# Patient Record
Sex: MALE | Race: WHITE | Employment: OTHER
[De-identification: names, ages, dates, MRNs, and addresses within clinical notes are randomized per-mention and may not be internally consistent; named-entity substitution may affect disease eponyms.]

---

## 2023-05-12 ENCOUNTER — APPOINTMENT (OUTPATIENT)
Facility: HOSPITAL | Age: 73
DRG: 194 | End: 2023-05-12
Payer: MEDICARE

## 2023-05-12 ENCOUNTER — HOSPITAL ENCOUNTER (EMERGENCY)
Facility: HOSPITAL | Age: 73
Discharge: HOME | DRG: 194 | End: 2023-05-12
Attending: EMERGENCY MEDICINE
Payer: MEDICARE

## 2023-05-12 VITALS
BODY MASS INDEX: 27.15 KG/M2 | RESPIRATION RATE: 19 BRPM | DIASTOLIC BLOOD PRESSURE: 50 MMHG | HEART RATE: 85 BPM | OXYGEN SATURATION: 96 % | TEMPERATURE: 97.9 F | WEIGHT: 173 LBS | SYSTOLIC BLOOD PRESSURE: 129 MMHG | HEIGHT: 67 IN

## 2023-05-12 DIAGNOSIS — E87.1 HYPONATREMIA: ICD-10-CM

## 2023-05-12 DIAGNOSIS — R53.83 OTHER FATIGUE: Primary | ICD-10-CM

## 2023-05-12 LAB
ALBUMIN SERPL-MCNC: 2.8 G/DL (ref 3.4–5)
ALBUMIN/GLOB SERPL: 0.7 (ref 0.8–1.7)
ALP SERPL-CCNC: 75 U/L (ref 45–117)
ALT SERPL-CCNC: 32 U/L (ref 16–61)
ANION GAP SERPL CALC-SCNC: 6 MMOL/L (ref 3–18)
AST SERPL-CCNC: 38 U/L (ref 10–38)
BASOPHILS # BLD: 0 K/UL (ref 0–0.1)
BASOPHILS NFR BLD: 0 % (ref 0–2)
BILIRUB SERPL-MCNC: 1.2 MG/DL (ref 0.2–1)
BUN SERPL-MCNC: 18 MG/DL (ref 7–18)
BUN/CREAT SERPL: 16 (ref 12–20)
CALCIUM SERPL-MCNC: 8.1 MG/DL (ref 8.5–10.1)
CHLORIDE SERPL-SCNC: 99 MMOL/L (ref 100–111)
CO2 SERPL-SCNC: 25 MMOL/L (ref 21–32)
CREAT SERPL-MCNC: 1.15 MG/DL (ref 0.6–1.3)
DIFFERENTIAL METHOD BLD: ABNORMAL
EKG ATRIAL RATE: 90 BPM
EKG DIAGNOSIS: NORMAL
EKG P AXIS: 35 DEGREES
EKG P-R INTERVAL: 152 MS
EKG Q-T INTERVAL: 360 MS
EKG QRS DURATION: 88 MS
EKG QTC CALCULATION (BAZETT): 440 MS
EKG R AXIS: 26 DEGREES
EKG T AXIS: 36 DEGREES
EKG VENTRICULAR RATE: 90 BPM
EOSINOPHIL # BLD: 0 K/UL (ref 0–0.4)
EOSINOPHIL NFR BLD: 0 % (ref 0–5)
ERYTHROCYTE [DISTWIDTH] IN BLOOD BY AUTOMATED COUNT: 13.1 % (ref 11.6–14.5)
GLOBULIN SER CALC-MCNC: 4.1 G/DL (ref 2–4)
GLUCOSE SERPL-MCNC: 99 MG/DL (ref 74–99)
HCT VFR BLD AUTO: 33 % (ref 36–48)
HGB BLD-MCNC: 11.5 G/DL (ref 13–16)
IMM GRANULOCYTES # BLD AUTO: 0.1 K/UL (ref 0–0.04)
IMM GRANULOCYTES NFR BLD AUTO: 0 % (ref 0–0.5)
LACTATE SERPL-SCNC: 0.6 MMOL/L (ref 0.4–2)
LYMPHOCYTES # BLD: 1 K/UL (ref 0.9–3.6)
LYMPHOCYTES NFR BLD: 8 % (ref 21–52)
MCH RBC QN AUTO: 30.9 PG (ref 24–34)
MCHC RBC AUTO-ENTMCNC: 34.8 G/DL (ref 31–37)
MCV RBC AUTO: 88.7 FL (ref 78–100)
MONOCYTES # BLD: 0.8 K/UL (ref 0.05–1.2)
MONOCYTES NFR BLD: 6 % (ref 3–10)
NEUTS SEG # BLD: 10.5 K/UL (ref 1.8–8)
NEUTS SEG NFR BLD: 85 % (ref 40–73)
NRBC # BLD: 0 K/UL (ref 0–0.01)
NRBC BLD-RTO: 0 PER 100 WBC
PLATELET # BLD AUTO: 190 K/UL (ref 135–420)
PMV BLD AUTO: 9.8 FL (ref 9.2–11.8)
POTASSIUM SERPL-SCNC: 3.7 MMOL/L (ref 3.5–5.5)
PROT SERPL-MCNC: 6.9 G/DL (ref 6.4–8.2)
RBC # BLD AUTO: 3.72 M/UL (ref 4.35–5.65)
SODIUM SERPL-SCNC: 130 MMOL/L (ref 136–145)
WBC # BLD AUTO: 12.4 K/UL (ref 4.6–13.2)

## 2023-05-12 PROCEDURE — 71045 X-RAY EXAM CHEST 1 VIEW: CPT

## 2023-05-12 PROCEDURE — 70498 CT ANGIOGRAPHY NECK: CPT

## 2023-05-12 PROCEDURE — 4A03X5D MEASUREMENT OF ARTERIAL FLOW, INTRACRANIAL, EXTERNAL APPROACH: ICD-10-PCS | Performed by: EMERGENCY MEDICINE

## 2023-05-12 PROCEDURE — 83605 ASSAY OF LACTIC ACID: CPT

## 2023-05-12 PROCEDURE — 87040 BLOOD CULTURE FOR BACTERIA: CPT

## 2023-05-12 PROCEDURE — 80053 COMPREHEN METABOLIC PANEL: CPT

## 2023-05-12 PROCEDURE — 2580000003 HC RX 258: Performed by: EMERGENCY MEDICINE

## 2023-05-12 PROCEDURE — 6360000004 HC RX CONTRAST MEDICATION: Performed by: EMERGENCY MEDICINE

## 2023-05-12 PROCEDURE — 70496 CT ANGIOGRAPHY HEAD: CPT

## 2023-05-12 PROCEDURE — 93005 ELECTROCARDIOGRAM TRACING: CPT | Performed by: EMERGENCY MEDICINE

## 2023-05-12 PROCEDURE — 85025 COMPLETE CBC W/AUTO DIFF WBC: CPT

## 2023-05-12 RX ORDER — 0.9 % SODIUM CHLORIDE 0.9 %
1000 INTRAVENOUS SOLUTION INTRAVENOUS ONCE
Status: COMPLETED | OUTPATIENT
Start: 2023-05-12 | End: 2023-05-12

## 2023-05-12 RX ADMIN — SODIUM CHLORIDE 1000 ML: 9 INJECTION, SOLUTION INTRAVENOUS at 22:43

## 2023-05-12 RX ADMIN — IOPAMIDOL 100 ML: 755 INJECTION, SOLUTION INTRAVENOUS at 20:00

## 2023-05-12 ASSESSMENT — PAIN - FUNCTIONAL ASSESSMENT: PAIN_FUNCTIONAL_ASSESSMENT: NONE - DENIES PAIN

## 2023-05-12 NOTE — ED TRIAGE NOTES
Pt arrived with c/o increased falls and instability x3 days. Pt describes increased dizziness and fatigued. Pt has weakness on his left arm r/t rotator cuff injury. Pt evaluated by PA in triage and no stroke work up is needed att.

## 2023-05-13 NOTE — ED PROVIDER NOTES
[facility] EMERGENCY DEPT  EMERGENCY DEPARTMENT ENCOUNTER    Patient Name: Halima Tobar  MRN: [id number]  YOB: 1950  Provider: Gissell Cook MD  PCP: No primary care provider on file. Time/Date of evaluation: 9:39 PM EDT on 5/12/23    History of Presenting Illness     Chief Complaint   Patient presents with    Fatigue    Fall       History Provided By: Patient and EMS     History [facility]):   Halima Tobar is a 68 y.o. male who presents to the emergency department 24-year-old male presents to the emergency department with dizziness and collapse at home. Patient complains about increased falls and instability for the last 3 days. Describes it as dizziness and fatigue. He has had some weakness of his left arm due to a rotator cuff injury and there was initially potential concern for stroke on presentation and when he came into the waiting room. PA evaluation did not reveal concern for acute stroke at that time. Patient relates that he was turning the faucet on for the outdoors pick it when he fell over in the bushes and required neighbors assistance to get up and move. Nursing Notes were all reviewed and agreed with or any disagreements were addressed in the HPI. Past History     Past Medical History:  No past medical history on file. Past Surgical History:  No past surgical history on file. Family History:  No family history on file. Social History:       Medications:  Current Facility-Administered Medications   Medication Dose Route Frequency Provider Last Rate Last Admin    0.9 % sodium chloride bolus  1,000 mL IntraVENous Once Gissell Cook .9 mL/hr at 05/12/23 2243 1,000 mL at 05/12/23 2243     No current outpatient medications on file.        Allergies:  No Known Allergies    Social Determinants of Health:  Social Determinants of Health     Tobacco Use: Not on file   Alcohol Use: Not on file   Financial Resource Strain: Not on file   Food Insecurity: Not on file

## 2023-05-14 ENCOUNTER — HOSPITAL ENCOUNTER (INPATIENT)
Facility: HOSPITAL | Age: 73
LOS: 3 days | Discharge: HOME | DRG: 194 | End: 2023-05-17
Attending: EMERGENCY MEDICINE | Admitting: FAMILY MEDICINE
Payer: MEDICARE

## 2023-05-14 ENCOUNTER — APPOINTMENT (OUTPATIENT)
Facility: HOSPITAL | Age: 73
DRG: 194 | End: 2023-05-14
Payer: MEDICARE

## 2023-05-14 DIAGNOSIS — J18.9 PNEUMONIA OF RIGHT UPPER LOBE DUE TO INFECTIOUS ORGANISM: Primary | ICD-10-CM

## 2023-05-14 DIAGNOSIS — J18.9 PNEUMONIA OF RIGHT LOWER LOBE DUE TO INFECTIOUS ORGANISM: ICD-10-CM

## 2023-05-14 DIAGNOSIS — J18.9 PNEUMONIA OF RIGHT MIDDLE LOBE DUE TO INFECTIOUS ORGANISM: ICD-10-CM

## 2023-05-14 DIAGNOSIS — R53.1 GENERAL WEAKNESS: ICD-10-CM

## 2023-05-14 LAB
ALBUMIN SERPL-MCNC: 2.3 G/DL (ref 3.4–5)
ALBUMIN/GLOB SERPL: 0.5 (ref 0.8–1.7)
ALP SERPL-CCNC: 85 U/L (ref 45–117)
ALT SERPL-CCNC: 38 U/L (ref 16–61)
ANION GAP SERPL CALC-SCNC: 7 MMOL/L (ref 3–18)
ANION GAP SERPL CALC-SCNC: 8 MMOL/L (ref 3–18)
AST SERPL-CCNC: 47 U/L (ref 10–38)
BACTERIA SPEC CULT: NORMAL
BACTERIA SPEC CULT: NORMAL
BASOPHILS # BLD: 0 K/UL (ref 0–0.1)
BASOPHILS NFR BLD: 0 % (ref 0–2)
BILIRUB SERPL-MCNC: 1.6 MG/DL (ref 0.2–1)
BUN SERPL-MCNC: 17 MG/DL (ref 7–18)
BUN SERPL-MCNC: 18 MG/DL (ref 7–18)
BUN/CREAT SERPL: 19 (ref 12–20)
BUN/CREAT SERPL: 20 (ref 12–20)
CALCIUM SERPL-MCNC: 8.4 MG/DL (ref 8.5–10.1)
CALCIUM SERPL-MCNC: 8.6 MG/DL (ref 8.5–10.1)
CHLORIDE SERPL-SCNC: 102 MMOL/L (ref 100–111)
CHLORIDE SERPL-SCNC: 99 MMOL/L (ref 100–111)
CO2 SERPL-SCNC: 23 MMOL/L (ref 21–32)
CO2 SERPL-SCNC: 26 MMOL/L (ref 21–32)
CREAT SERPL-MCNC: 0.86 MG/DL (ref 0.6–1.3)
CREAT SERPL-MCNC: 0.95 MG/DL (ref 0.6–1.3)
DIFFERENTIAL METHOD BLD: ABNORMAL
EOSINOPHIL # BLD: 0 K/UL (ref 0–0.4)
EOSINOPHIL NFR BLD: 0 % (ref 0–5)
ERYTHROCYTE [DISTWIDTH] IN BLOOD BY AUTOMATED COUNT: 13.2 % (ref 11.6–14.5)
FLUAV RNA SPEC QL NAA+PROBE: NOT DETECTED
FLUBV RNA SPEC QL NAA+PROBE: NOT DETECTED
GLOBULIN SER CALC-MCNC: 4.2 G/DL (ref 2–4)
GLUCOSE SERPL-MCNC: 91 MG/DL (ref 74–99)
GLUCOSE SERPL-MCNC: 94 MG/DL (ref 74–99)
HCT VFR BLD AUTO: 32.5 % (ref 36–48)
HGB BLD-MCNC: 11.2 G/DL (ref 13–16)
IMM GRANULOCYTES # BLD AUTO: 1.1 K/UL (ref 0–0.04)
IMM GRANULOCYTES NFR BLD AUTO: 8 % (ref 0–0.5)
LACTATE BLD-SCNC: 0.92 MMOL/L (ref 0.4–2)
LYMPHOCYTES # BLD: 0.7 K/UL (ref 0.9–3.6)
LYMPHOCYTES NFR BLD: 5 % (ref 21–52)
MAGNESIUM SERPL-MCNC: 2.1 MG/DL (ref 1.6–2.6)
MCH RBC QN AUTO: 31.3 PG (ref 24–34)
MCHC RBC AUTO-ENTMCNC: 34.5 G/DL (ref 31–37)
MCV RBC AUTO: 90.8 FL (ref 78–100)
MONOCYTES # BLD: 0.8 K/UL (ref 0.05–1.2)
MONOCYTES NFR BLD: 6 % (ref 3–10)
NEUTS SEG # BLD: 10.5 K/UL (ref 1.8–8)
NEUTS SEG NFR BLD: 80 % (ref 40–73)
NRBC # BLD: 0 K/UL (ref 0–0.01)
NRBC BLD-RTO: 0 PER 100 WBC
PLATELET # BLD AUTO: 215 K/UL (ref 135–420)
PMV BLD AUTO: 10.3 FL (ref 9.2–11.8)
POTASSIUM SERPL-SCNC: 3.3 MMOL/L (ref 3.5–5.5)
POTASSIUM SERPL-SCNC: 4.5 MMOL/L (ref 3.5–5.5)
PROT SERPL-MCNC: 6.5 G/DL (ref 6.4–8.2)
RBC # BLD AUTO: 3.58 M/UL (ref 4.35–5.65)
SARS-COV-2 RNA RESP QL NAA+PROBE: NOT DETECTED
SERVICE CMNT-IMP: NORMAL
SERVICE CMNT-IMP: NORMAL
SODIUM SERPL-SCNC: 132 MMOL/L (ref 136–145)
SODIUM SERPL-SCNC: 133 MMOL/L (ref 136–145)
TROPONIN I SERPL HS-MCNC: 12 NG/L (ref 0–78)
TROPONIN I SERPL HS-MCNC: 21 NG/L (ref 0–78)
WBC # BLD AUTO: 13.1 K/UL (ref 4.6–13.2)

## 2023-05-14 PROCEDURE — 99285 EMERGENCY DEPT VISIT HI MDM: CPT

## 2023-05-14 PROCEDURE — 71045 X-RAY EXAM CHEST 1 VIEW: CPT

## 2023-05-14 PROCEDURE — 2580000003 HC RX 258: Performed by: EMERGENCY MEDICINE

## 2023-05-14 PROCEDURE — 1100000000 HC RM PRIVATE

## 2023-05-14 PROCEDURE — 87040 BLOOD CULTURE FOR BACTERIA: CPT

## 2023-05-14 PROCEDURE — 85025 COMPLETE CBC W/AUTO DIFF WBC: CPT

## 2023-05-14 PROCEDURE — 93005 ELECTROCARDIOGRAM TRACING: CPT | Performed by: EMERGENCY MEDICINE

## 2023-05-14 PROCEDURE — 6360000002 HC RX W HCPCS: Performed by: EMERGENCY MEDICINE

## 2023-05-14 PROCEDURE — 96361 HYDRATE IV INFUSION ADD-ON: CPT

## 2023-05-14 PROCEDURE — 87636 SARSCOV2 & INF A&B AMP PRB: CPT

## 2023-05-14 PROCEDURE — 71260 CT THORAX DX C+: CPT

## 2023-05-14 PROCEDURE — 2580000003 HC RX 258: Performed by: FAMILY MEDICINE

## 2023-05-14 PROCEDURE — 84484 ASSAY OF TROPONIN QUANT: CPT

## 2023-05-14 PROCEDURE — 83605 ASSAY OF LACTIC ACID: CPT

## 2023-05-14 PROCEDURE — 6360000004 HC RX CONTRAST MEDICATION: Performed by: EMERGENCY MEDICINE

## 2023-05-14 PROCEDURE — 96375 TX/PRO/DX INJ NEW DRUG ADDON: CPT

## 2023-05-14 PROCEDURE — 80053 COMPREHEN METABOLIC PANEL: CPT

## 2023-05-14 PROCEDURE — 96365 THER/PROPH/DIAG IV INF INIT: CPT

## 2023-05-14 PROCEDURE — 83735 ASSAY OF MAGNESIUM: CPT

## 2023-05-14 RX ORDER — SODIUM CHLORIDE 0.9 % (FLUSH) 0.9 %
5-40 SYRINGE (ML) INJECTION EVERY 12 HOURS SCHEDULED
Status: DISCONTINUED | OUTPATIENT
Start: 2023-05-14 | End: 2023-05-17 | Stop reason: HOSPADM

## 2023-05-14 RX ORDER — SODIUM CHLORIDE 0.9 % (FLUSH) 0.9 %
5-40 SYRINGE (ML) INJECTION PRN
Status: DISCONTINUED | OUTPATIENT
Start: 2023-05-14 | End: 2023-05-17 | Stop reason: HOSPADM

## 2023-05-14 RX ORDER — BUDESONIDE 0.5 MG/2ML
0.5 INHALANT ORAL 2 TIMES DAILY
Status: DISCONTINUED | OUTPATIENT
Start: 2023-05-14 | End: 2023-05-17 | Stop reason: HOSPADM

## 2023-05-14 RX ORDER — ACETAMINOPHEN 650 MG/1
650 SUPPOSITORY RECTAL EVERY 6 HOURS PRN
Status: DISCONTINUED | OUTPATIENT
Start: 2023-05-14 | End: 2023-05-17 | Stop reason: HOSPADM

## 2023-05-14 RX ORDER — ONDANSETRON 2 MG/ML
4 INJECTION INTRAMUSCULAR; INTRAVENOUS EVERY 6 HOURS PRN
Status: DISCONTINUED | OUTPATIENT
Start: 2023-05-14 | End: 2023-05-17 | Stop reason: HOSPADM

## 2023-05-14 RX ORDER — ENOXAPARIN SODIUM 100 MG/ML
40 INJECTION SUBCUTANEOUS DAILY
Status: DISCONTINUED | OUTPATIENT
Start: 2023-05-15 | End: 2023-05-17 | Stop reason: HOSPADM

## 2023-05-14 RX ORDER — GUAIFENESIN/DEXTROMETHORPHAN 100-10MG/5
5 SYRUP ORAL EVERY 4 HOURS PRN
Status: DISCONTINUED | OUTPATIENT
Start: 2023-05-14 | End: 2023-05-17 | Stop reason: HOSPADM

## 2023-05-14 RX ORDER — ALPRAZOLAM 1 MG/1
1 TABLET ORAL 2 TIMES DAILY
Status: ON HOLD | COMMUNITY
Start: 2023-04-05 | End: 2023-05-15

## 2023-05-14 RX ORDER — IPRATROPIUM BROMIDE AND ALBUTEROL SULFATE 2.5; .5 MG/3ML; MG/3ML
1 SOLUTION RESPIRATORY (INHALATION) EVERY 4 HOURS PRN
Status: DISCONTINUED | OUTPATIENT
Start: 2023-05-14 | End: 2023-05-17 | Stop reason: HOSPADM

## 2023-05-14 RX ORDER — QUETIAPINE FUMARATE 50 MG/1
50 TABLET, FILM COATED ORAL DAILY
Status: ON HOLD | COMMUNITY
Start: 2021-01-27 | End: 2023-05-15

## 2023-05-14 RX ORDER — 0.9 % SODIUM CHLORIDE 0.9 %
1000 INTRAVENOUS SOLUTION INTRAVENOUS ONCE
Status: COMPLETED | OUTPATIENT
Start: 2023-05-14 | End: 2023-05-14

## 2023-05-14 RX ORDER — ROSUVASTATIN CALCIUM 20 MG/1
20 TABLET, COATED ORAL DAILY
COMMUNITY
Start: 2023-01-04

## 2023-05-14 RX ORDER — POLYETHYLENE GLYCOL 3350 17 G/17G
17 POWDER, FOR SOLUTION ORAL DAILY PRN
Status: DISCONTINUED | OUTPATIENT
Start: 2023-05-14 | End: 2023-05-17 | Stop reason: HOSPADM

## 2023-05-14 RX ORDER — SODIUM CHLORIDE 9 MG/ML
INJECTION, SOLUTION INTRAVENOUS PRN
Status: DISCONTINUED | OUTPATIENT
Start: 2023-05-14 | End: 2023-05-17 | Stop reason: HOSPADM

## 2023-05-14 RX ORDER — ONDANSETRON 4 MG/1
4 TABLET, ORALLY DISINTEGRATING ORAL EVERY 8 HOURS PRN
Status: DISCONTINUED | OUTPATIENT
Start: 2023-05-14 | End: 2023-05-17 | Stop reason: HOSPADM

## 2023-05-14 RX ORDER — ACETAMINOPHEN 325 MG/1
650 TABLET ORAL EVERY 6 HOURS PRN
Status: DISCONTINUED | OUTPATIENT
Start: 2023-05-14 | End: 2023-05-17 | Stop reason: HOSPADM

## 2023-05-14 RX ORDER — ALPRAZOLAM 1 MG/1
1 TABLET ORAL AS NEEDED
COMMUNITY
Start: 2023-05-09

## 2023-05-14 RX ORDER — FAMOTIDINE 20 MG/1
20 TABLET, FILM COATED ORAL 2 TIMES DAILY
Status: DISCONTINUED | OUTPATIENT
Start: 2023-05-14 | End: 2023-05-17 | Stop reason: HOSPADM

## 2023-05-14 RX ORDER — MAGNESIUM HYDROXIDE/ALUMINUM HYDROXICE/SIMETHICONE 120; 1200; 1200 MG/30ML; MG/30ML; MG/30ML
30 SUSPENSION ORAL EVERY 6 HOURS PRN
Status: DISCONTINUED | OUTPATIENT
Start: 2023-05-14 | End: 2023-05-17 | Stop reason: HOSPADM

## 2023-05-14 RX ORDER — AZITHROMYCIN 250 MG/1
500 TABLET, FILM COATED ORAL EVERY 24 HOURS
Status: DISCONTINUED | OUTPATIENT
Start: 2023-05-15 | End: 2023-05-15

## 2023-05-14 RX ORDER — SERTRALINE HYDROCHLORIDE 100 MG/1
100 TABLET, FILM COATED ORAL DAILY
COMMUNITY
Start: 2017-02-06

## 2023-05-14 RX ADMIN — AZITHROMYCIN MONOHYDRATE 500 MG: 500 INJECTION, POWDER, LYOPHILIZED, FOR SOLUTION INTRAVENOUS at 19:19

## 2023-05-14 RX ADMIN — CEFTRIAXONE 1000 MG: 1 INJECTION, POWDER, FOR SOLUTION INTRAMUSCULAR; INTRAVENOUS at 18:40

## 2023-05-14 RX ADMIN — SODIUM CHLORIDE 1000 ML: 900 INJECTION, SOLUTION INTRAVENOUS at 14:00

## 2023-05-14 RX ADMIN — SODIUM CHLORIDE, PRESERVATIVE FREE 10 ML: 5 INJECTION INTRAVENOUS at 22:36

## 2023-05-14 RX ADMIN — IOPAMIDOL 100 ML: 612 INJECTION, SOLUTION INTRAVENOUS at 19:11

## 2023-05-14 ASSESSMENT — PAIN DESCRIPTION - LOCATION: LOCATION: ABDOMEN

## 2023-05-14 ASSESSMENT — PAIN - FUNCTIONAL ASSESSMENT: PAIN_FUNCTIONAL_ASSESSMENT: 0-10

## 2023-05-14 ASSESSMENT — PAIN SCALES - GENERAL
PAINLEVEL_OUTOF10: 3
PAINLEVEL_OUTOF10: 0

## 2023-05-15 PROBLEM — R19.7 DIARRHEA: Status: ACTIVE | Noted: 2023-05-15

## 2023-05-15 PROBLEM — F17.290: Status: ACTIVE | Noted: 2023-05-15

## 2023-05-15 PROBLEM — J44.9 CHRONIC BRONCHITIS WITH COPD (CHRONIC OBSTRUCTIVE PULMONARY DISEASE) (HCC): Status: ACTIVE | Noted: 2023-05-15

## 2023-05-15 PROBLEM — E78.5 HLD (HYPERLIPIDEMIA): Status: ACTIVE | Noted: 2023-05-15

## 2023-05-15 PROBLEM — F41.9 ANXIETY: Status: ACTIVE | Noted: 2023-05-15

## 2023-05-15 PROBLEM — R53.1 GENERALIZED WEAKNESS: Status: ACTIVE | Noted: 2023-05-15

## 2023-05-15 LAB
ANION GAP SERPL CALC-SCNC: 8 MMOL/L (ref 3–18)
APPEARANCE UR: CLEAR
BACTERIA URNS QL MICRO: ABNORMAL /HPF
BASOPHILS # BLD: 0.1 K/UL (ref 0–0.1)
BASOPHILS NFR BLD: 0 % (ref 0–2)
BILIRUB UR QL: NEGATIVE
BUN SERPL-MCNC: 19 MG/DL (ref 7–18)
BUN/CREAT SERPL: 26 (ref 12–20)
CALCIUM SERPL-MCNC: 8.1 MG/DL (ref 8.5–10.1)
CHLORIDE SERPL-SCNC: 104 MMOL/L (ref 100–111)
CO2 SERPL-SCNC: 24 MMOL/L (ref 21–32)
COLOR UR: YELLOW
CREAT SERPL-MCNC: 0.74 MG/DL (ref 0.6–1.3)
DIFFERENTIAL METHOD BLD: ABNORMAL
EKG ATRIAL RATE: 101 BPM
EKG ATRIAL RATE: 90 BPM
EKG ATRIAL RATE: 98 BPM
EKG DIAGNOSIS: NORMAL
EKG P AXIS: 35 DEGREES
EKG P AXIS: 47 DEGREES
EKG P AXIS: 50 DEGREES
EKG P-R INTERVAL: 140 MS
EKG P-R INTERVAL: 144 MS
EKG P-R INTERVAL: 152 MS
EKG Q-T INTERVAL: 346 MS
EKG Q-T INTERVAL: 348 MS
EKG Q-T INTERVAL: 360 MS
EKG QRS DURATION: 88 MS
EKG QRS DURATION: 92 MS
EKG QRS DURATION: 92 MS
EKG QTC CALCULATION (BAZETT): 440 MS
EKG QTC CALCULATION (BAZETT): 444 MS
EKG QTC CALCULATION (BAZETT): 448 MS
EKG R AXIS: 26 DEGREES
EKG R AXIS: 31 DEGREES
EKG R AXIS: 32 DEGREES
EKG T AXIS: 18 DEGREES
EKG T AXIS: 2 DEGREES
EKG T AXIS: 36 DEGREES
EKG VENTRICULAR RATE: 101 BPM
EKG VENTRICULAR RATE: 90 BPM
EKG VENTRICULAR RATE: 98 BPM
EOSINOPHIL # BLD: 0 K/UL (ref 0–0.4)
EOSINOPHIL NFR BLD: 0 % (ref 0–5)
EPITH CASTS URNS QL MICRO: ABNORMAL /LPF (ref 0–5)
ERYTHROCYTE [DISTWIDTH] IN BLOOD BY AUTOMATED COUNT: 13.3 % (ref 11.6–14.5)
GLUCOSE SERPL-MCNC: 81 MG/DL (ref 74–99)
GLUCOSE UR STRIP.AUTO-MCNC: NEGATIVE MG/DL
HCT VFR BLD AUTO: 29.6 % (ref 36–48)
HGB BLD-MCNC: 10.2 G/DL (ref 13–16)
HGB UR QL STRIP: ABNORMAL
IMM GRANULOCYTES # BLD AUTO: 0.1 K/UL (ref 0–0.04)
IMM GRANULOCYTES NFR BLD AUTO: 1 % (ref 0–0.5)
KETONES UR QL STRIP.AUTO: 15 MG/DL
L PNEUMO AG UR QL IA: NEGATIVE
LEUKOCYTE ESTERASE UR QL STRIP.AUTO: NEGATIVE
LYMPHOCYTES # BLD: 0.8 K/UL (ref 0.9–3.6)
LYMPHOCYTES NFR BLD: 6 % (ref 21–52)
MAGNESIUM SERPL-MCNC: 2.4 MG/DL (ref 1.6–2.6)
MCH RBC QN AUTO: 30.9 PG (ref 24–34)
MCHC RBC AUTO-ENTMCNC: 34.5 G/DL (ref 31–37)
MCV RBC AUTO: 89.7 FL (ref 78–100)
MONOCYTES # BLD: 0.7 K/UL (ref 0.05–1.2)
MONOCYTES NFR BLD: 6 % (ref 3–10)
NEUTS SEG # BLD: 10.7 K/UL (ref 1.8–8)
NEUTS SEG NFR BLD: 87 % (ref 40–73)
NITRITE UR QL STRIP.AUTO: NEGATIVE
NRBC # BLD: 0 K/UL (ref 0–0.01)
NRBC BLD-RTO: 0 PER 100 WBC
PH UR STRIP: 5.5 (ref 5–8)
PLATELET # BLD AUTO: 227 K/UL (ref 135–420)
PMV BLD AUTO: 10 FL (ref 9.2–11.8)
POTASSIUM SERPL-SCNC: 3.4 MMOL/L (ref 3.5–5.5)
PROCALCITONIN SERPL-MCNC: 1.79 NG/ML
PROT UR STRIP-MCNC: 30 MG/DL
RBC # BLD AUTO: 3.3 M/UL (ref 4.35–5.65)
RBC #/AREA URNS HPF: ABNORMAL /HPF (ref 0–5)
S PNEUM AG UR QL: NEGATIVE
SODIUM SERPL-SCNC: 136 MMOL/L (ref 136–145)
SP GR UR REFRACTOMETRY: >1.03 (ref 1–1.03)
UROBILINOGEN UR QL STRIP.AUTO: 1 EU/DL (ref 0.2–1)
WBC # BLD AUTO: 12.3 K/UL (ref 4.6–13.2)
WBC URNS QL MICRO: ABNORMAL /HPF (ref 0–5)

## 2023-05-15 PROCEDURE — 97162 PT EVAL MOD COMPLEX 30 MIN: CPT

## 2023-05-15 PROCEDURE — 6370000000 HC RX 637 (ALT 250 FOR IP): Performed by: HOSPITALIST

## 2023-05-15 PROCEDURE — 84145 PROCALCITONIN (PCT): CPT

## 2023-05-15 PROCEDURE — 97116 GAIT TRAINING THERAPY: CPT

## 2023-05-15 PROCEDURE — 6360000002 HC RX W HCPCS: Performed by: FAMILY MEDICINE

## 2023-05-15 PROCEDURE — 81001 URINALYSIS AUTO W/SCOPE: CPT

## 2023-05-15 PROCEDURE — 94640 AIRWAY INHALATION TREATMENT: CPT

## 2023-05-15 PROCEDURE — 93010 ELECTROCARDIOGRAM REPORT: CPT | Performed by: INTERNAL MEDICINE

## 2023-05-15 PROCEDURE — 87324 CLOSTRIDIUM AG IA: CPT

## 2023-05-15 PROCEDURE — 6370000000 HC RX 637 (ALT 250 FOR IP): Performed by: FAMILY MEDICINE

## 2023-05-15 PROCEDURE — 87506 IADNA-DNA/RNA PROBE TQ 6-11: CPT

## 2023-05-15 PROCEDURE — 83735 ASSAY OF MAGNESIUM: CPT

## 2023-05-15 PROCEDURE — 87449 NOS EACH ORGANISM AG IA: CPT

## 2023-05-15 PROCEDURE — 6370000000 HC RX 637 (ALT 250 FOR IP): Performed by: INTERNAL MEDICINE

## 2023-05-15 PROCEDURE — 1100000000 HC RM PRIVATE

## 2023-05-15 PROCEDURE — 80048 BASIC METABOLIC PNL TOTAL CA: CPT

## 2023-05-15 PROCEDURE — 36415 COLL VENOUS BLD VENIPUNCTURE: CPT

## 2023-05-15 PROCEDURE — 85025 COMPLETE CBC W/AUTO DIFF WBC: CPT

## 2023-05-15 PROCEDURE — 2580000003 HC RX 258: Performed by: FAMILY MEDICINE

## 2023-05-15 RX ORDER — ROSUVASTATIN CALCIUM 10 MG/1
20 TABLET, COATED ORAL DAILY
Status: DISCONTINUED | OUTPATIENT
Start: 2023-05-15 | End: 2023-05-17 | Stop reason: HOSPADM

## 2023-05-15 RX ORDER — ALPRAZOLAM 0.5 MG/1
1 TABLET ORAL DAILY PRN
Status: DISCONTINUED | OUTPATIENT
Start: 2023-05-15 | End: 2023-05-17 | Stop reason: HOSPADM

## 2023-05-15 RX ORDER — AZITHROMYCIN 250 MG/1
500 TABLET, FILM COATED ORAL EVERY 24 HOURS
Status: COMPLETED | OUTPATIENT
Start: 2023-05-15 | End: 2023-05-16

## 2023-05-15 RX ORDER — POTASSIUM CHLORIDE 20 MEQ/1
40 TABLET, EXTENDED RELEASE ORAL ONCE
Status: COMPLETED | OUTPATIENT
Start: 2023-05-15 | End: 2023-05-15

## 2023-05-15 RX ADMIN — ROSUVASTATIN 20 MG: 10 TABLET, FILM COATED ORAL at 16:42

## 2023-05-15 RX ADMIN — BUDESONIDE INHALATION 500 MCG: 0.5 SUSPENSION RESPIRATORY (INHALATION) at 07:59

## 2023-05-15 RX ADMIN — SERTRALINE 100 MG: 50 TABLET, FILM COATED ORAL at 16:42

## 2023-05-15 RX ADMIN — SODIUM CHLORIDE, PRESERVATIVE FREE 10 ML: 5 INJECTION INTRAVENOUS at 08:23

## 2023-05-15 RX ADMIN — SODIUM CHLORIDE, PRESERVATIVE FREE 10 ML: 5 INJECTION INTRAVENOUS at 22:56

## 2023-05-15 RX ADMIN — AZITHROMYCIN MONOHYDRATE 500 MG: 250 TABLET ORAL at 19:30

## 2023-05-15 RX ADMIN — BUDESONIDE INHALATION 500 MCG: 0.5 SUSPENSION RESPIRATORY (INHALATION) at 19:26

## 2023-05-15 RX ADMIN — POTASSIUM CHLORIDE 40 MEQ: 1500 TABLET, EXTENDED RELEASE ORAL at 16:43

## 2023-05-15 RX ADMIN — FAMOTIDINE 20 MG: 20 TABLET, FILM COATED ORAL at 08:23

## 2023-05-15 RX ADMIN — FAMOTIDINE 20 MG: 20 TABLET, FILM COATED ORAL at 22:56

## 2023-05-15 RX ADMIN — ENOXAPARIN SODIUM 40 MG: 100 INJECTION SUBCUTANEOUS at 08:23

## 2023-05-15 NOTE — PLAN OF CARE
Problem: Skin/Tissue Integrity  Goal: Absence of new skin breakdown  Description: 1. Monitor for areas of redness and/or skin breakdown  2. Assess vascular access sites hourly  3. Every 4-6 hours minimum:  Change oxygen saturation probe site  4. Every 4-6 hours:  If on nasal continuous positive airway pressure, respiratory therapy assess nares and determine need for appliance change or resting period.   Outcome: Progressing     Problem: Safety - Adult  Goal: Free from fall injury  Outcome: Progressing  Flowsheets (Taken 5/14/2023 2210)  Free From Fall Injury:   Instruct family/caregiver on patient safety   Based on caregiver fall risk screen, instruct family/caregiver to ask for assistance with transferring infant if caregiver noted to have fall risk factors     Problem: ABCDS Injury Assessment  Goal: Absence of physical injury  Outcome: Progressing  Flowsheets (Taken 5/14/2023 2210)  Absence of Physical Injury: Implement safety measures based on patient assessment     Problem: Pain  Goal: Verbalizes/displays adequate comfort level or baseline comfort level  Outcome: Progressing  Flowsheets (Taken 5/14/2023 2210)  Verbalizes/displays adequate comfort level or baseline comfort level:   Encourage patient to monitor pain and request assistance   Assess pain using appropriate pain scale   Administer analgesics based on type and severity of pain and evaluate response   Implement non-pharmacological measures as appropriate and evaluate response

## 2023-05-15 NOTE — H&P
History & Physical    Patient: Jacqueline Krishnamurthy MRN: [id number]  CSN: [id number]    YOB: 1950  Age: 68 y.o. Sex: male      DOA: 5/14/2023  Primary Care Provider:  No primary care provider on file. Assessment/Plan   77-year-old male past medical history of COPD, hyperlipidemia and longstanding history of being a pipe smoker. Admitted for multifocal pneumonia and diarrhea. Multifocal pneumonia -  Moderate to severe multifocal pneumonia on the right.  Small right-sided pleural effusion is associated seen on CT chest abdomen pelvis with and without contrast  Urine Legionella pending  Urine strep pneumoniae pending  Procalcitonin pending  Respiratory culture pending  Continue IV Rocephin and azithromycin  ID consult in a.m., Dr. Alesia Polk  Lactic acid unremarkable  Blood cultures pending  Mild leukocytosis with left shift, trend daily CBC  Influenza A/influenza B/COVID-19 PCR test are unremarkable    Generalized weakness-  No focal findings  Likely due to severe multifocal pneumonia  Expect improvement as pneumonia treated  Contributing factor also likely diarrhea  PT OT consults    Diarrhea-  Stool studies  C. difficile testing  CT Abd/pelvis >no acute findings    COPD, chronic -  No acute exacerbation  DuoNebs as needed  Pulmicort neb twice daily    HLD -  Resume home meds as appropriate     Advise discontinuation of pipe smoking permanently    DVT and GI prophylaxis   Active Hospital Problems    Diagnosis Date Noted    Generalized weakness [R53.1] 05/15/2023    Diarrhea [R19.7] 05/15/2023    Chronic bronchitis with COPD (chronic obstructive pulmonary disease) ([facility] [place] [address] [J44.9] 05/15/2023    Smokes a pipe [F17.290] 05/15/2023    HLD (hyperlipidemia) [E78.5] 05/15/2023    Multifocal pneumonia [J18.9] 05/14/2023       Estimated length of stay : 3 to 4 days    CC: Generalized weakness with diarrhea       HPI:     Jacqueline Krishnamurthy is a 68 y.o. male past medical history of COPD, hyperlipidemia and longstanding

## 2023-05-15 NOTE — PLAN OF CARE
2210 Received pt from ER in full PPE for COVID R/O, pending swab result. Admission questions completed, PTA med list up to date. Pt is A&O x4, slightly anxious and restless. Denies pain. Tachypnea noted at rest, lungs diminished bilaterally in RA. Pt is [place] bilaterally, but does not have any aids. Tele box #44 attached, monitor showing NSR. Active BS in all quadrants, +flatus, abdomen soft and non-tender. Incontinence care provided on the bed, pt able to turn side by side without difficulty. BLE weakness, fall precautions placed. New 22 G access placed by Maddi Garrett RN on L forearm, pt tolerated well. Ice water placed on bedside table per pt's request. No other needs at this time. 0305 Pt easy to arouse from sleep, VSS, NAD. Emptied 220 mL of radha colored urine from urinal. Informed pt that MD wants to rule out c. Diff for diarrhea PTA, pt verbalized understanding. Tele monitor still showing NSR. Will continue current plan of care. 0530 Incontinence care provided, pt tolerated well. Specimen containers left on bedside table and reminded pt to call whenever specimen is available, pt verbalized understanding. Safety precautions re-placed. No other needs at this time. 3921 Urine specimen collected and sent down to lab.    0730 Bedside and Verbal shift change report given to Ale Muse RN (oncoming nurse) by Zeyad Hu RN (offgoing nurse). Report included the following information Nurse Handoff Report, Index, Adult Overview, Intake/Output, MAR, Recent Results, and Cardiac Rhythm NSR, box #44 .

## 2023-05-15 NOTE — ED NOTES
TRANSFER - OUT REPORT:    Verbal report given to Elli Carlisle RN  on Joel Mora  being transferred to Room 328 for routine progression of patient care       Report consisted of patient's Situation, Background, Assessment and   Recommendations(SBAR). Lines:   Peripheral IV 05/14/23 Right;Left Antecubital (Active)   Site Assessment Clean, dry & intact 05/14/23 1346   Line Status Blood return noted 05/14/23 [address] changed 05/14/23 1346   Phlebitis Assessment No symptoms 05/14/23 1346   Infiltration Assessment 0 05/14/23 1346   Alcohol Cap Used Yes 05/14/23 1346   Dressing Status New dressing applied 05/14/23 1346   Dressing Type Transparent 05/14/23 1346   Dressing Intervention New 05/14/23 1346        Opportunity for questions and clarification was provided.       Patient transported with:  Luz Elena Garcia RN  05/14/23 7770

## 2023-05-15 NOTE — CONSULTS
[facility]  (A Division of [address] [place] [address])                                                           Date of Admission: 5/14/2023   Date of Note: 5/15/2023  Reason for Consult: Multifocal PNA  Referring MD: Dr Syed Barron    Thank you for involving me in the care of this patient. Please do not hesitate to contact me on the above number if question or concern. Current Antimicrobials:    Prior Antimicrobials:  Ceftriaxone/Zithromqax 5/14 to date   NA   Allergy to antibiotics: None     Assessment--ID related:     CAP Multifocal- RUL. RML and RLL with small R effusion  Mild Leucocytosis due to above  Falls likely due to above  --Blood culture   - 5/12 and 5/14: [facility]  --Urine culture -NA  --resp culture - not obtained yet  --Viral test /PCR for COVID 19/Influenza A and B -negative. --CXR RLL pneumonia 5/12-- with progression to 5/14/23.  CT chest and neck reports reviewed  Has poor dentition  Slightly elevated AST  Diarrhea -- unclear etiology  Electrolyte issues-Na, K  L shoulder pain and weakness- chronic- suspected due to rotator cuff tear    Active Hospital Problems    Diagnosis     Generalized weakness [R53.1]     Diarrhea [R19.7]     Chronic bronchitis with COPD (chronic obstructive pulmonary disease) ([facility] [address] [J44.9]     Smokes a pipe [F17.290]     HLD (hyperlipidemia) [E78.5]     Multifocal pneumonia [J18.9]         Recommendation -- ID related:     Cont CTX/ Zithromax- dose of Ceftriaxone adjusted  Sputum ordered not collected yet- patient given container today  FU blood culture- until final  FU enteric panel/ urinary antigens  HIV testing in am  Follow CBC with diff, cmp  FU CXR -- to assess if pleural effusion is increasing-- if increased, will need pleural tap  Needs dental care as OP  DW RN         HPI:    Ruth Jamil  is 68 y.o.  who smokes pipe daily, with COPD and hyperlipidemia., and chronic L shoulder pain over 6 months and work up an evaluation

## 2023-05-16 ENCOUNTER — APPOINTMENT (OUTPATIENT)
Facility: HOSPITAL | Age: 73
DRG: 194 | End: 2023-05-16
Payer: MEDICARE

## 2023-05-16 LAB
ANION GAP SERPL CALC-SCNC: 9 MMOL/L (ref 3–18)
BASOPHILS # BLD: 0 K/UL (ref 0–0.1)
BASOPHILS NFR BLD: 0 % (ref 0–2)
BUN SERPL-MCNC: 25 MG/DL (ref 7–18)
BUN/CREAT SERPL: 30 (ref 12–20)
C COLI+JEJUNI TUF STL QL NAA+PROBE: NEGATIVE
C DIFF GDH STL QL: NEGATIVE
C DIFF TOX A+B STL QL IA: NEGATIVE
C DIFF TOXIN INTERPRETATION: NORMAL
CALCIUM SERPL-MCNC: 8.2 MG/DL (ref 8.5–10.1)
CHLORIDE SERPL-SCNC: 103 MMOL/L (ref 100–111)
CO2 SERPL-SCNC: 23 MMOL/L (ref 21–32)
CREAT SERPL-MCNC: 0.84 MG/DL (ref 0.6–1.3)
DIFFERENTIAL METHOD BLD: ABNORMAL
EC STX1+STX2 GENES STL QL NAA+PROBE: NEGATIVE
EOSINOPHIL # BLD: 0.1 K/UL (ref 0–0.4)
EOSINOPHIL NFR BLD: 1 % (ref 0–5)
ERYTHROCYTE [DISTWIDTH] IN BLOOD BY AUTOMATED COUNT: 13.7 % (ref 11.6–14.5)
ETEC ELTA+ESTB GENES STL QL NAA+PROBE: NEGATIVE
GLUCOSE SERPL-MCNC: 102 MG/DL (ref 74–99)
HCT VFR BLD AUTO: 29.4 % (ref 36–48)
HGB BLD-MCNC: 9.9 G/DL (ref 13–16)
HIV 1+2 AB+HIV1 P24 AG SERPL QL IA: NONREACTIVE
HIV 1/2 RESULT COMMENT: NORMAL
IMM GRANULOCYTES # BLD AUTO: 0.1 K/UL (ref 0–0.04)
IMM GRANULOCYTES NFR BLD AUTO: 1 % (ref 0–0.5)
LYMPHOCYTES # BLD: 0.9 K/UL (ref 0.9–3.6)
LYMPHOCYTES NFR BLD: 7 % (ref 21–52)
MAGNESIUM SERPL-MCNC: 2.5 MG/DL (ref 1.6–2.6)
MCH RBC QN AUTO: 30.7 PG (ref 24–34)
MCHC RBC AUTO-ENTMCNC: 33.7 G/DL (ref 31–37)
MCV RBC AUTO: 91.3 FL (ref 78–100)
MONOCYTES # BLD: 0.6 K/UL (ref 0.05–1.2)
MONOCYTES NFR BLD: 5 % (ref 3–10)
NEUTS SEG # BLD: 11.3 K/UL (ref 1.8–8)
NEUTS SEG NFR BLD: 86 % (ref 40–73)
NRBC # BLD: 0 K/UL (ref 0–0.01)
NRBC BLD-RTO: 0 PER 100 WBC
P SHIGELLOIDES DNA STL QL NAA+PROBE: NEGATIVE
PLATELET # BLD AUTO: 251 K/UL (ref 135–420)
PMV BLD AUTO: 10.2 FL (ref 9.2–11.8)
POTASSIUM SERPL-SCNC: 3.3 MMOL/L (ref 3.5–5.5)
RBC # BLD AUTO: 3.22 M/UL (ref 4.35–5.65)
SALMONELLA SP SPAO STL QL NAA+PROBE: NEGATIVE
SHIGELLA SP+EIEC IPAH STL QL NAA+PROBE: NEGATIVE
SODIUM SERPL-SCNC: 135 MMOL/L (ref 136–145)
V CHOL+PARA+VUL DNA STL QL NAA+NON-PROBE: NEGATIVE
WBC # BLD AUTO: 13.2 K/UL (ref 4.6–13.2)
Y ENTEROCOL DNA STL QL NAA+NON-PROBE: NEGATIVE

## 2023-05-16 PROCEDURE — 6370000000 HC RX 637 (ALT 250 FOR IP): Performed by: HOSPITALIST

## 2023-05-16 PROCEDURE — 71046 X-RAY EXAM CHEST 2 VIEWS: CPT

## 2023-05-16 PROCEDURE — 97116 GAIT TRAINING THERAPY: CPT

## 2023-05-16 PROCEDURE — 1100000000 HC RM PRIVATE

## 2023-05-16 PROCEDURE — 6370000000 HC RX 637 (ALT 250 FOR IP): Performed by: FAMILY MEDICINE

## 2023-05-16 PROCEDURE — 6370000000 HC RX 637 (ALT 250 FOR IP): Performed by: INTERNAL MEDICINE

## 2023-05-16 PROCEDURE — 80048 BASIC METABOLIC PNL TOTAL CA: CPT

## 2023-05-16 PROCEDURE — 87389 HIV-1 AG W/HIV-1&-2 AB AG IA: CPT

## 2023-05-16 PROCEDURE — 2580000003 HC RX 258: Performed by: FAMILY MEDICINE

## 2023-05-16 PROCEDURE — 85025 COMPLETE CBC W/AUTO DIFF WBC: CPT

## 2023-05-16 PROCEDURE — 6360000002 HC RX W HCPCS: Performed by: FAMILY MEDICINE

## 2023-05-16 PROCEDURE — 6360000002 HC RX W HCPCS: Performed by: INTERNAL MEDICINE

## 2023-05-16 PROCEDURE — 2580000003 HC RX 258: Performed by: INTERNAL MEDICINE

## 2023-05-16 PROCEDURE — 36415 COLL VENOUS BLD VENIPUNCTURE: CPT

## 2023-05-16 PROCEDURE — 83735 ASSAY OF MAGNESIUM: CPT

## 2023-05-16 PROCEDURE — 94640 AIRWAY INHALATION TREATMENT: CPT

## 2023-05-16 RX ORDER — POTASSIUM CHLORIDE 20 MEQ/1
40 TABLET, EXTENDED RELEASE ORAL ONCE
Status: COMPLETED | OUTPATIENT
Start: 2023-05-16 | End: 2023-05-16

## 2023-05-16 RX ADMIN — CEFTRIAXONE SODIUM 2000 MG: 2 INJECTION, POWDER, FOR SOLUTION INTRAMUSCULAR; INTRAVENOUS at 15:08

## 2023-05-16 RX ADMIN — BUDESONIDE INHALATION 500 MCG: 0.5 SUSPENSION RESPIRATORY (INHALATION) at 19:36

## 2023-05-16 RX ADMIN — SODIUM CHLORIDE, PRESERVATIVE FREE 10 ML: 5 INJECTION INTRAVENOUS at 21:09

## 2023-05-16 RX ADMIN — POTASSIUM CHLORIDE 40 MEQ: 1500 TABLET, EXTENDED RELEASE ORAL at 12:16

## 2023-05-16 RX ADMIN — ENOXAPARIN SODIUM 40 MG: 100 INJECTION SUBCUTANEOUS at 09:52

## 2023-05-16 RX ADMIN — SERTRALINE 100 MG: 50 TABLET, FILM COATED ORAL at 09:52

## 2023-05-16 RX ADMIN — SODIUM CHLORIDE, PRESERVATIVE FREE 10 ML: 5 INJECTION INTRAVENOUS at 09:54

## 2023-05-16 RX ADMIN — BUDESONIDE INHALATION 500 MCG: 0.5 SUSPENSION RESPIRATORY (INHALATION) at 07:37

## 2023-05-16 RX ADMIN — AZITHROMYCIN MONOHYDRATE 500 MG: 250 TABLET ORAL at 18:41

## 2023-05-16 RX ADMIN — FAMOTIDINE 20 MG: 20 TABLET, FILM COATED ORAL at 21:09

## 2023-05-16 RX ADMIN — FAMOTIDINE 20 MG: 20 TABLET, FILM COATED ORAL at 09:51

## 2023-05-16 RX ADMIN — ROSUVASTATIN 20 MG: 10 TABLET, FILM COATED ORAL at 09:52

## 2023-05-16 NOTE — CARE COORDINATION
D/c plan: home. Spouse to transport her home. CM spoke w/ pt's spouse. Confirmed information on the pt's face sheet. The pt lives at home with his spouse and autistic grandson. Pt is independent in all ADLs and IDLs at baseline without an assistive device. Discussed HH. Spouse declined HH and stated that she can assist w/ the pt. Pt will d/c home w/ spouse. Case Management Assessment  Initial Evaluation    Date/Time of Evaluation: 5/16/2023 2:14 PM  Assessment Completed by: Clarita Crocker RN    If patient is discharged prior to next notation, then this note serves as note for discharge by case management. Patient Name: Hunter Hammonds                   YOB: 1950  Diagnosis: General weakness [R53.1]  Pneumonia of right upper lobe due to infectious organism [J18.9]  Pneumonia of right middle lobe due to infectious organism [J18.9]  Pneumonia of right lower lobe due to infectious organism [J18.9]  Pneumonia, unspecified organism [J18.9]                   Date / Time: 5/14/2023  1:37 PM    Patient Admission Status: Inpatient   Readmission Risk (Low < 19, Mod (19-27), High > 27): Readmission Risk Score: 11.6    Current PCP: No primary care provider on file. PCP verified by CM? Yes    Chart Reviewed: Yes      History Provided by: Patient  Patient Orientation: Alert and Oriented    Patient Cognition: Alert    Hospitalization in the last 30 days (Readmission):  No    If yes, Readmission Assessment in CM Navigator will be completed.     Advance Directives:      Code Status: Full Code   Patient's Primary Decision Maker is:        Discharge Planning:    Patient lives with: Spouse/Significant Other, Family Members Type of Home: House  Primary Care Giver: Self  Patient Support Systems include: Spouse/Significant Other   Current Financial resources: Medicare  Current community resources:    Current services prior to admission: None            Current DME:              Type of Home Care services:

## 2023-05-16 NOTE — PLAN OF CARE
Problem: Skin/Tissue Integrity  Goal: Absence of new skin breakdown  Description: 1. Monitor for areas of redness and/or skin breakdown  2. Assess vascular access sites hourly  3. Every 4-6 hours minimum:  Change oxygen saturation probe site  4. Every 4-6 hours:  If on nasal continuous positive airway pressure, respiratory therapy assess nares and determine need for appliance change or resting period.   Outcome: Progressing     Problem: Safety - Adult  Goal: Free from fall injury  Outcome: Progressing  Flowsheets (Taken 5/15/2023 2250)  Free From Fall Injury:   Instruct family/caregiver on patient safety   Based on caregiver fall risk screen, instruct family/caregiver to ask for assistance with transferring infant if caregiver noted to have fall risk factors     Problem: ABCDS Injury Assessment  Goal: Absence of physical injury  Outcome: Progressing  Flowsheets (Taken 5/15/2023 2250)  Absence of Physical Injury: Implement safety measures based on patient assessment     Problem: Pain  Goal: Verbalizes/displays adequate comfort level or baseline comfort level  Outcome: Progressing  Flowsheets (Taken 5/15/2023 2250)  Verbalizes/displays adequate comfort level or baseline comfort level:   Encourage patient to monitor pain and request assistance   Assess pain using appropriate pain scale   Administer analgesics based on type and severity of pain and evaluate response   Implement non-pharmacological measures as appropriate and evaluate response

## 2023-05-17 VITALS
DIASTOLIC BLOOD PRESSURE: 60 MMHG | HEART RATE: 89 BPM | TEMPERATURE: 98.2 F | SYSTOLIC BLOOD PRESSURE: 146 MMHG | HEIGHT: 67 IN | BODY MASS INDEX: 26.45 KG/M2 | WEIGHT: 168.5 LBS | RESPIRATION RATE: 18 BRPM | OXYGEN SATURATION: 95 %

## 2023-05-17 LAB
ALBUMIN SERPL-MCNC: 2.2 G/DL (ref 3.4–5)
ALBUMIN/GLOB SERPL: 0.5 (ref 0.8–1.7)
ALP SERPL-CCNC: 102 U/L (ref 45–117)
ALT SERPL-CCNC: 190 U/L (ref 16–61)
ANION GAP SERPL CALC-SCNC: 8 MMOL/L (ref 3–18)
AST SERPL-CCNC: 343 U/L (ref 10–38)
BACTERIA SPEC CULT: NORMAL
BACTERIA SPEC CULT: NORMAL
BASOPHILS # BLD: 0 K/UL (ref 0–0.1)
BASOPHILS NFR BLD: 0 % (ref 0–2)
BILIRUB SERPL-MCNC: 0.7 MG/DL (ref 0.2–1)
BUN SERPL-MCNC: 19 MG/DL (ref 7–18)
BUN/CREAT SERPL: 24 (ref 12–20)
CALCIUM SERPL-MCNC: 8.2 MG/DL (ref 8.5–10.1)
CHLORIDE SERPL-SCNC: 103 MMOL/L (ref 100–111)
CO2 SERPL-SCNC: 24 MMOL/L (ref 21–32)
CREAT SERPL-MCNC: 0.78 MG/DL (ref 0.6–1.3)
DIFFERENTIAL METHOD BLD: ABNORMAL
EOSINOPHIL # BLD: 0.2 K/UL (ref 0–0.4)
EOSINOPHIL NFR BLD: 2 % (ref 0–5)
ERYTHROCYTE [DISTWIDTH] IN BLOOD BY AUTOMATED COUNT: 13.9 % (ref 11.6–14.5)
GLOBULIN SER CALC-MCNC: 4.2 G/DL (ref 2–4)
GLUCOSE SERPL-MCNC: 100 MG/DL (ref 74–99)
HCT VFR BLD AUTO: 31.4 % (ref 36–48)
HGB BLD-MCNC: 10.9 G/DL (ref 13–16)
IMM GRANULOCYTES # BLD AUTO: 0.2 K/UL (ref 0–0.04)
IMM GRANULOCYTES NFR BLD AUTO: 1 % (ref 0–0.5)
LYMPHOCYTES # BLD: 1.1 K/UL (ref 0.9–3.6)
LYMPHOCYTES NFR BLD: 8 % (ref 21–52)
MCH RBC QN AUTO: 31.3 PG (ref 24–34)
MCHC RBC AUTO-ENTMCNC: 34.7 G/DL (ref 31–37)
MCV RBC AUTO: 90.2 FL (ref 78–100)
MONOCYTES # BLD: 0.6 K/UL (ref 0.05–1.2)
MONOCYTES NFR BLD: 5 % (ref 3–10)
NEUTS SEG # BLD: 11.5 K/UL (ref 1.8–8)
NEUTS SEG NFR BLD: 84 % (ref 40–73)
NRBC # BLD: 0 K/UL (ref 0–0.01)
NRBC BLD-RTO: 0 PER 100 WBC
PLATELET # BLD AUTO: 265 K/UL (ref 135–420)
PMV BLD AUTO: 11.4 FL (ref 9.2–11.8)
POTASSIUM SERPL-SCNC: 3.8 MMOL/L (ref 3.5–5.5)
PROT SERPL-MCNC: 6.4 G/DL (ref 6.4–8.2)
RBC # BLD AUTO: 3.48 M/UL (ref 4.35–5.65)
SERVICE CMNT-IMP: NORMAL
SODIUM SERPL-SCNC: 135 MMOL/L (ref 136–145)
WBC # BLD AUTO: 13.6 K/UL (ref 4.6–13.2)

## 2023-05-17 PROCEDURE — 36415 COLL VENOUS BLD VENIPUNCTURE: CPT

## 2023-05-17 PROCEDURE — 97116 GAIT TRAINING THERAPY: CPT

## 2023-05-17 PROCEDURE — 6360000002 HC RX W HCPCS: Performed by: FAMILY MEDICINE

## 2023-05-17 PROCEDURE — 6370000000 HC RX 637 (ALT 250 FOR IP): Performed by: FAMILY MEDICINE

## 2023-05-17 PROCEDURE — 94640 AIRWAY INHALATION TREATMENT: CPT

## 2023-05-17 PROCEDURE — 6370000000 HC RX 637 (ALT 250 FOR IP): Performed by: HOSPITALIST

## 2023-05-17 PROCEDURE — 2580000003 HC RX 258: Performed by: FAMILY MEDICINE

## 2023-05-17 PROCEDURE — 80053 COMPREHEN METABOLIC PANEL: CPT

## 2023-05-17 PROCEDURE — 85025 COMPLETE CBC W/AUTO DIFF WBC: CPT

## 2023-05-17 RX ORDER — GUAIFENESIN/DEXTROMETHORPHAN 100-10MG/5
5 SYRUP ORAL EVERY 4 HOURS PRN
Qty: 120 ML | Refills: 0 | Status: SHIPPED | OUTPATIENT
Start: 2023-05-17 | End: 2023-05-27

## 2023-05-17 RX ORDER — LEVOFLOXACIN 750 MG/1
750 TABLET ORAL DAILY
Qty: 7 TABLET | Refills: 0 | Status: SHIPPED | OUTPATIENT
Start: 2023-05-17 | End: 2023-05-24

## 2023-05-17 RX ORDER — LOPERAMIDE HYDROCHLORIDE 2 MG/1
2 CAPSULE ORAL 4 TIMES DAILY PRN
Status: DISCONTINUED | OUTPATIENT
Start: 2023-05-17 | End: 2023-05-17 | Stop reason: HOSPADM

## 2023-05-17 RX ADMIN — FAMOTIDINE 20 MG: 20 TABLET, FILM COATED ORAL at 09:15

## 2023-05-17 RX ADMIN — ENOXAPARIN SODIUM 40 MG: 100 INJECTION SUBCUTANEOUS at 09:14

## 2023-05-17 RX ADMIN — ROSUVASTATIN 20 MG: 10 TABLET, FILM COATED ORAL at 09:15

## 2023-05-17 RX ADMIN — BUDESONIDE INHALATION 500 MCG: 0.5 SUSPENSION RESPIRATORY (INHALATION) at 07:12

## 2023-05-17 RX ADMIN — SODIUM CHLORIDE, PRESERVATIVE FREE 10 ML: 5 INJECTION INTRAVENOUS at 09:43

## 2023-05-17 RX ADMIN — SERTRALINE 100 MG: 50 TABLET, FILM COATED ORAL at 09:15

## 2023-05-17 ASSESSMENT — PAIN SCALES - GENERAL: PAINLEVEL_OUTOF10: 0

## 2023-05-17 NOTE — DISCHARGE SUMMARY
chronically missing maxillary and mandibular teeth. Visualized soft tissues of the skull base and neck are unremarkable. Right maxillary sinus mucus retention cyst versus polyp and moderate nodular diffuse mucosal thickening of the paranasal sinuses. The mastoids are free of fluid bilaterally. Visualized lung apices demonstrate right upper lobe groundglass/consolidative opacity with marked diffuse bronchial wall thickening and endobronchial debris. No acute fracture or aggressive osseous lesion. Degenerative changes of the cervical spine with at least mild to moderate spinal canal narrowing at C5-C6 and C6-C7. CTA 1. CTA neck demonstrates no large vessel occlusion, high-grade stenosis or aneurysm. Dilated pulmonary artery, which may be seen with chronic pulmonary hypertension. 2. CTA head demonstrates no large vessel occlusion, high-grade stenosis or aneurysm. 3. No intracranial mass or enhancing lesion. Others: Right upper lobe opacity with bronchial wall thickening and endobronchial debris, suboptimally evaluated and may suggest aspiration. Clinical correlation advised. Paranasal sinus disease. .    XR CHEST (2 VW)    Result Date: 5/16/2023  EXAM: XR CHEST (2 VW) INDICATION: Follow-up pneumonia COMPARISON: 5/14/2023 TECHNIQUE: PA and lateral chest 2 views FINDINGS: Monitoring leads. The cardiac size is within normal limits. The pulmonary vasculature is within normal limits. Residual airspace disease in the right lung with some improvement. The visualized bones and upper abdomen are age-appropriate. Zone airspace disease in the right lung with some improvement     XR CHEST PORTABLE    Result Date: 5/14/2023  EXAM:  XR CHEST PORTABLE INDICATION: Chest pain and extremity weakness COMPARISON: 5/12/2023 TECHNIQUE: Upright portable chest AP view FINDINGS: Cardiac monitoring leads. The cardiac silhouette is within normal limits. The pulmonary vasculature is within normal limits.  Significant increase in airspace

## 2023-05-18 LAB
BACTERIA SPEC CULT: NORMAL
BACTERIA SPEC CULT: NORMAL
SERVICE CMNT-IMP: NORMAL
SERVICE CMNT-IMP: NORMAL

## 2023-05-19 NOTE — PROGRESS NOTES
200  AVS review with pt, opportunity for questions and concerns at this time. D/c IV clean and dry. Properly discarded armbands.
Called [phone] no one answer the phone message left
Hospitalist Progress Note-critical care note     Patient: Eden Whitt MRN: [id number]  CSN: [id number]    YOB: 1950  Age: 68 y.o. Sex: male    DOA: 5/14/2023 LOS:  LOS: 1 day            Chief complaint: pna, copd , diarrhea     Assessment/Plan         Active Hospital Problems    Diagnosis Date Noted    Generalized weakness [R53.1] 05/15/2023    Diarrhea [R19.7] 05/15/2023    Chronic bronchitis with COPD (chronic obstructive pulmonary disease) ([facility] [address] [J44.9] 05/15/2023    Smokes a pipe [F17.290] 05/15/2023    HLD (hyperlipidemia) [E78.5] 05/15/2023    Anxiety [F41.9] 05/15/2023    Multifocal pneumonia [J18.9] 05/14/2023          Multifocal pneumonia -  Reported feel better   Ct chest :Moderate to severe multifocal pneumonia on the right. Small right-sided pleural effusion I  Continue IV Rocephin and azithromycin  Influenza A/influenza B/COVID-19 PCR test are unremarkable  Id consulted      Generalized weakness-  No focal findings  Likely due to severe multifocal pneumonia  Expect improvement as pneumonia treated  Contributing factor also likely diarrhea  PT OT consults     Diarrhea-  Stool studies, improving   C. difficile testing     COPD, chronic -  No acute exacerbation  DuoNebs as needed  Pulmicort neb twice daily     HLD -  Resume home meds as appropriate     Hypokalemia   K replacement     Anxiety   Continue home meds     Some pipe life style modification       Subjective feel better than last night     Disposition :tbd,   Review of systems:    General: No fevers or chills. Cardiovascular: No chest pain or pressure. No palpitations. Pulmonary: mild shortness of breath. Gastrointestinal: No nausea, vomiting. Vital signs/Intake and Output:  Visit Vitals  BP (!) 123/52   Pulse 87   Temp 98.6 °F (37 °C) (Oral)   Resp 18   Ht 5' 7\" (1.702 m)   Wt 171 lb 6.4 oz (77.7 kg)   SpO2 95%   BMI 26.85 kg/m²     Current Shift:  No intake/output data recorded.   Last three shifts:  No intake/output
[facility]  (A Division of [address] [place] [address])                                                           Date of Admission: 5/14/2023   Date of Note: 5/16/2023  Reason for Consult: Multifocal PNA  Referring MD: Dr Colby Garcia    Current Antimicrobials:    Prior Antimicrobials:  Ceftriaxone/Zithromqax 5/14 to date   NA   Allergy to antibiotics: None     Assessment--ID related:     CAP Multifocal- RUL. RML and RLL with small R effusion  Mild Leucocytosis due to above  Falls likely due to above  --Blood culture   - 5/12 and 5/14: NGSF  --Urine culture -NA  --resp culture - not obtained yet  --Viral test /PCR for COVID 19/Influenza A and B -negative. --CXR RLL pneumonia 5/12-- with progression to 5/14/23. CT chest and neck reports reviewed  --HIV test-- non reactive 5/16  Has poor dentition  Slightly elevated AST  Diarrhea -- unclear etiology-- C.diff testing: Negative 5/15  Electrolyte issues-Na, K  L shoulder pain and weakness- chronic- suspected due to rotator cuff tear    Active Hospital Problems    Diagnosis     Generalized weakness [R53.1]     Diarrhea [R19.7]     Chronic bronchitis with COPD (chronic obstructive pulmonary disease) ([facility] [address] [J44.9]     Smokes a pipe [F17.290]     HLD (hyperlipidemia) [E78.5]     Multifocal pneumonia [J18.9]         Recommendation -- ID related:     DC c.diff isolation  Cont CTX/ Zithromax  FU CXR improved today  Follow cbc in am  To switch over to levofloxacin 750 mg PO for another 7 days and can DC if he continues to improve tomorrow. FU CXR in 6-8 weeks with PCP indicated  Needs dental care as OP- other supportive services per primary  DW RN      Subjective:      Feels much better. Better energy, less cough. Right chest- no pain. Afebrile. Asking if he can go home. He is anxious as he is main support for his family     Notes/Labs/Cultures and Imaging reports reviewed --WBC 13K. HIV test negative.  CXR today shows improving
Physical Therapy Goals:  Initiated 5/16/2023 to be met within 7-10 days. Short Term Goals  Time Frame for Short Term Goals: 7-10 days  Short Term Goal 1: Pt will demonstrate independence in supine<>sit for home performance at discharge. Short Term Goal 2: Pt will demonstrate transfers with independence sit<> prep for ambulation. Short Term Goal 3: Pt will demonstrate ambulation 150ft with LRAD/mod I to independent for increased functional mobility at discharge. Short Term Goal 4: Pt will demonstrate step negotiation with HR 3-5 steps for home re-entry. PHYSICAL THERAPY TREATMENT    Patient: Jacqueline Krishnamurthy (62 y.o. male)  Date: 5/16/2023  Diagnosis: General weakness [R53.1]  Pneumonia of right upper lobe due to infectious organism [J18.9]  Pneumonia of right middle lobe due to infectious organism [J18.9]  Pneumonia of right lower lobe due to infectious organism [J18.9]  Pneumonia, unspecified organism [J18.9] Multifocal pneumonia  Precautions: Fall Risk, Contact      ASSESSMENT:   Pt has met goals 1.2 for bed mobility and transfers, and progressing toward other goals for gait performance. Tolerating session well with O2 sat 96% pre and 93% post activity. Gait distance increased to 120ft. GIANNA slightly increased with decr'd foot clearance. Pt left up in chair at bedside with tray in front and all needs in reach. Will continue for unmet goals above. Progression toward goals:   [x]      Improving appropriately and progressing toward goals  []      Improving slowly and progressing toward goals  []      Not making progress toward goals and plan of care will be adjusted     PLAN:  Patient continues to benefit from skilled intervention to address the above impairments. Continue treatment per established plan of care.     Further Equipment Recommendations for Discharge: N/a  Discharge Recommendation:HHPT    AMPAC: 20    This AMPAC score should be considered in conjunction with interdisciplinary team
Physical Therapy Goals:  Initiated 5/17/2023 to be met within 7-10 days. Short Term Goals  Time Frame for Short Term Goals: 7-10 days  Short Term Goal 1: Pt will demonstrate independence in supine<>sit for home performance at discharge. Short Term Goal 2: Pt will demonstrate transfers with independence sit<> prep for ambulation. Short Term Goal 3: Pt will demonstrate ambulation 150ft with LRAD/mod I to independent for increased functional mobility at discharge. Short Term Goal 4: Pt will demonstrate step negotiation with HR 3-5 steps for home re-entry. []  Patient has met MD gema arguello for d/c home   []  Recommend HH with 24 hour adult care   []  Benefit from additional acute PT session to address:  stair training      PHYSICAL THERAPY TREATMENT    Patient: Sundar Stark (65 y.o. male)  Date: 5/17/2023  Diagnosis: General weakness [R53.1]  Pneumonia of right upper lobe due to infectious organism [J18.9]  Pneumonia of right middle lobe due to infectious organism [J18.9]  Pneumonia of right lower lobe due to infectious organism [J18.9]  Pneumonia, unspecified organism [J18.9] Multifocal pneumonia    Precautions: Fall Risk  PLOF: independent, ambulatory without AD    ASSESSMENT:  Assessment  Assessment: Pt supine in bed upon arrival.  No assistance sitting up EOB without railings. No difficulty performing sit to stand. Ambulated 340ft without AD, steady pace, no LOB or path deviations. Returned to room and left sitting in chair. Pt is safe to ambulate without assistance in halls.   Activity Tolerance: Patient tolerated treatment well  Discharge Recommendations: Home independently    Progression toward goals:   []      Improving appropriately and progressing toward goals  []      Improving slowly and progressing toward goals  []      Not making progress toward goals and plan of care will be adjusted     PLAN:  Patient continues to benefit from skilled intervention to address the above
Physician Progress Note      Aníbal Prescott  [facility] #:                  [id number]  :                       1950  ADMIT DATE:       2023 1:37 PM  [address] DATE:        2023 12:20 PM  RESPONDING  PROVIDER #:        Brad Tovar MD          QUERY TEXT:    Pt admitted with weakness. Pt noted to have pneumonia with hypoxia. If   possible, please document in the progress notes and discharge summary if you   are evaluating and/or treating any of the following:      Note: CAP and HCAP indicate where the pneumonia was acquired, not a specific   type. The medical record reflects the following:  Risk Factors: Pneumonia  Clinical Indicators:  H&P: Admitted for multifocal pneumonia and diarrhea    / ID/CAP Multifocal- RUL. RML and RLL with small R effusion    / Discharge summary/Pt was admitted for pna with hypoxia    CT CHEST/Moderate to severe multifocal pneumonia on the right. Small   right-sided pleural  effusion is associated. Treatment: Rocephin, Azithromycin      Thank You  Trisha Choudhary RN,CDI,CRCR  Options provided:  -- Gram negative pneumonia  -- Gram positive pneumonia  -- Bacterial  pneumonia  -- Other - I will add my own diagnosis  -- Disagree - Not applicable / Not valid  -- Disagree - Clinically unable to determine / Unknown  -- Refer to Clinical Documentation Reviewer    PROVIDER RESPONSE TEXT:    This patient has Bacterial pneumonia.     Query created by: Damari Ambriz on 2023 7:20 AM      Electronically signed by:  Brad Tovar MD 2023 1:52 PM
is associated with a discharge to the patient's home setting. Based on an AM-PAC score of /24 (or 19/20 if omitting stairs) and their current functional mobility deficits, it is recommended that the patient have 2-3 sessions per week of Physical Therapy at d/c to increase the patient's independence.
dilatation. There is no evidence of splenomegaly. ADRENALS/KIDNEYS: No mass. There is no hydronephrosis. There is no renal mass. There is no perinephric mass. STOMACH: No dilatation or wall thickening. COLON AND SMALL BOWEL: Nondistended. There is no free intraperitoneal air. There is no evidence of incarceration or obstruction. No mesenteric adenopathy. PERITONEUM: Fat-containing inguinal hernia APPENDIX: Unremarkable. BLADDER/REPRODUCTIVE ORGANS: Bladder diverticulum on the right. RETROPERITONEUM: Unremarkable. The abdominal aorta is normal in caliber. No aneurysm. No retroperitoneal adenopathy. OSSEOUS STRUCTURES: No destructive bone lesion. Moderate to severe multifocal pneumonia on the right. Small right-sided pleural effusion is associated. No acute intraperitoneal process is identified. Please see above for additional nonemergent incidental findings.         Davis Meredith MD

## 2023-06-19 ENCOUNTER — HOSPITAL ENCOUNTER (OUTPATIENT)
Facility: HOSPITAL | Age: 73
Discharge: HOME | End: 2023-06-22
Payer: MEDICARE

## 2023-06-19 DIAGNOSIS — J18.9 MULTIFOCAL PNEUMONIA: ICD-10-CM

## 2023-06-19 DIAGNOSIS — J44.9 CHRONIC BRONCHITIS WITH COPD (CHRONIC OBSTRUCTIVE PULMONARY DISEASE) (HCC): ICD-10-CM

## 2023-06-19 PROCEDURE — 71046 X-RAY EXAM CHEST 2 VIEWS: CPT

## 2023-06-21 ENCOUNTER — HOSPITAL ENCOUNTER (OUTPATIENT)
Facility: HOSPITAL | Age: 73
Discharge: HOME | End: 2023-06-21
Payer: MEDICARE

## 2023-06-21 VITALS
BODY MASS INDEX: 27.26 KG/M2 | TEMPERATURE: 97.7 F | WEIGHT: 163.6 LBS | DIASTOLIC BLOOD PRESSURE: 58 MMHG | HEART RATE: 67 BPM | SYSTOLIC BLOOD PRESSURE: 132 MMHG | OXYGEN SATURATION: 100 % | HEIGHT: 65 IN | RESPIRATION RATE: 18 BRPM

## 2023-06-21 PROCEDURE — 99211 OFF/OP EST MAY X REQ PHY/QHP: CPT

## 2023-06-21 NOTE — PROGRESS NOTES
[facility]  (A Division of [address] [place] [address])                                                           Date of Admission: (Not on file)   Date of Note: 6/21/2023  Reason for Consult: Multifocal PNA  Referring MD: Dr Mccord GlanceCorrine Fraction    Current Antimicrobials:    Prior Antimicrobials:    None   Ceftriaxone/Zithromqax     Allergy to antibiotics: None     Assessment--ID related:     CAP Multifocal- RUL. RML and RLL with small R effusion: Improved         FU CXR 6/19: 1. Mild residual opacity medially in the right lower lobe in the infrahilar         region projecting in the retrocardiac region. This is likely postinflammatory         change. Follow-up to document complete resolution is suggested    Mild Leucocytosis    Falls likely due to above- during admission  May 2023  --Blood culture   - 5/12 and 5/14: NGSF  --Urine culture -NA  --resp culture - not obtained yet  --Viral test /PCR for COVID 19/Influenza A and B -negative. --CXR RLL pneumonia 5/12-- with progression to 5/14/23. CT chest and neck reports reviewed  --HIV test-- non reactive 5/16    Poor dentition    Diarrhea -- unclear etiology-- C.diff testing: Negative 5/15/23:  Resolved    L shoulder pain and weakness- chronic- suspected due to rotator cuff tear    Active Hospital Problems    Diagnosis     Generalized weakness [R53.1]     Diarrhea [R19.7]     Chronic bronchitis with COPD (chronic obstructive pulmonary disease) ([facility] [address] [J44.9]     Smokes a pipe [F17.290]     HLD (hyperlipidemia) [E78.5]     Multifocal pneumonia [J18.9]         Recommendation -- ID related:     Improved pneumonia-- some residual opacity on right side of lung. He will need FU in 2-3 months.  Advised to schedule visit and continue further FU and work up with PCP  Advised dental care  ID FU PRN      Subjective:      Hospital discharge FU  He feels better, cough resolved, no chest pain  Continues to smoke pipe, but has decreased amount since